# Patient Record
Sex: FEMALE | Race: WHITE | Employment: UNEMPLOYED | URBAN - METROPOLITAN AREA
[De-identification: names, ages, dates, MRNs, and addresses within clinical notes are randomized per-mention and may not be internally consistent; named-entity substitution may affect disease eponyms.]

---

## 2017-12-26 ENCOUNTER — LAB REQUISITION (OUTPATIENT)
Dept: LAB | Age: 57
End: 2017-12-26
Attending: INTERNAL MEDICINE

## 2017-12-26 DIAGNOSIS — L03.90 CELLULITIS: ICD-10-CM

## 2017-12-26 PROCEDURE — 85652 RBC SED RATE AUTOMATED: CPT | Performed by: INTERNAL MEDICINE

## 2017-12-26 PROCEDURE — 80048 BASIC METABOLIC PNL TOTAL CA: CPT | Performed by: INTERNAL MEDICINE

## 2017-12-26 PROCEDURE — 85025 COMPLETE CBC W/AUTO DIFF WBC: CPT | Performed by: INTERNAL MEDICINE

## 2018-01-02 ENCOUNTER — LAB REQUISITION (OUTPATIENT)
Dept: LAB | Age: 58
End: 2018-01-02
Attending: INTERNAL MEDICINE

## 2018-01-02 DIAGNOSIS — L03.90 CELLULITIS: ICD-10-CM

## 2018-01-02 LAB
BASOPHILS # BLD AUTO: 0.04 X10(3) UL (ref 0–0.1)
BASOPHILS NFR BLD AUTO: 0.5 %
BUN BLD-MCNC: 11 MG/DL (ref 8–20)
CALCIUM BLD-MCNC: 9.5 MG/DL (ref 8.3–10.3)
CHLORIDE: 107 MMOL/L (ref 101–111)
CO2: 26 MMOL/L (ref 22–32)
CREAT BLD-MCNC: 0.72 MG/DL (ref 0.55–1.02)
EOSINOPHIL # BLD AUTO: 0.09 X10(3) UL (ref 0–0.3)
EOSINOPHIL NFR BLD AUTO: 1.1 %
ERYTHROCYTE [DISTWIDTH] IN BLOOD BY AUTOMATED COUNT: 19.1 % (ref 11.5–16)
GLUCOSE BLD-MCNC: 87 MG/DL (ref 70–99)
HCT VFR BLD AUTO: 35 % (ref 34–50)
HGB BLD-MCNC: 10.4 G/DL (ref 12–16)
IMMATURE GRANULOCYTE COUNT: 0.01 X10(3) UL (ref 0–1)
IMMATURE GRANULOCYTE RATIO %: 0.1 %
LYMPHOCYTES # BLD AUTO: 2.11 X10(3) UL (ref 0.9–4)
LYMPHOCYTES NFR BLD AUTO: 26 %
MCH RBC QN AUTO: 24.3 PG (ref 27–33.2)
MCHC RBC AUTO-ENTMCNC: 29.7 G/DL (ref 31–37)
MCV RBC AUTO: 81.8 FL (ref 81–100)
MONOCYTES # BLD AUTO: 0.68 X10(3) UL (ref 0.1–0.6)
MONOCYTES NFR BLD AUTO: 8.4 %
NEUTROPHIL ABS PRELIM: 5.2 X10 (3) UL (ref 1.3–6.7)
NEUTROPHILS # BLD AUTO: 5.2 X10(3) UL (ref 1.3–6.7)
NEUTROPHILS NFR BLD AUTO: 63.9 %
PLATELET # BLD AUTO: 437 10(3)UL (ref 150–450)
POTASSIUM SERPL-SCNC: 3.7 MMOL/L (ref 3.6–5.1)
RBC # BLD AUTO: 4.28 X10(6)UL (ref 3.8–5.1)
RED CELL DISTRIBUTION WIDTH-SD: 56.2 FL (ref 35.1–46.3)
SED RATE-ML: 37 MM/HR (ref 0–25)
SODIUM SERPL-SCNC: 140 MMOL/L (ref 136–144)
WBC # BLD AUTO: 8.1 X10(3) UL (ref 4–13)

## 2018-01-02 PROCEDURE — 80048 BASIC METABOLIC PNL TOTAL CA: CPT | Performed by: INTERNAL MEDICINE

## 2018-01-02 PROCEDURE — 85652 RBC SED RATE AUTOMATED: CPT | Performed by: INTERNAL MEDICINE

## 2018-01-02 PROCEDURE — 85025 COMPLETE CBC W/AUTO DIFF WBC: CPT | Performed by: INTERNAL MEDICINE

## 2018-01-15 ENCOUNTER — LAB REQUISITION (OUTPATIENT)
Dept: LAB | Age: 58
End: 2018-01-15
Attending: INTERNAL MEDICINE

## 2018-01-15 DIAGNOSIS — L03.90 CELLULITIS: ICD-10-CM

## 2018-01-15 LAB
BASOPHILS # BLD AUTO: 0.04 X10(3) UL (ref 0–0.1)
BASOPHILS NFR BLD AUTO: 0.4 %
BUN BLD-MCNC: 9 MG/DL (ref 8–20)
C-REACTIVE PROTEIN: 9.7 MG/DL (ref ?–1)
CALCIUM BLD-MCNC: 9.3 MG/DL (ref 8.3–10.3)
CHLORIDE: 101 MMOL/L (ref 101–111)
CO2: 26 MMOL/L (ref 22–32)
CREAT BLD-MCNC: 0.73 MG/DL (ref 0.55–1.02)
EOSINOPHIL # BLD AUTO: 0.07 X10(3) UL (ref 0–0.3)
EOSINOPHIL NFR BLD AUTO: 0.7 %
ERYTHROCYTE [DISTWIDTH] IN BLOOD BY AUTOMATED COUNT: 19.2 % (ref 11.5–16)
GLUCOSE BLD-MCNC: 80 MG/DL (ref 70–99)
HCT VFR BLD AUTO: 35.1 % (ref 34–50)
HGB BLD-MCNC: 10.7 G/DL (ref 12–16)
IMMATURE GRANULOCYTE COUNT: 0.03 X10(3) UL (ref 0–1)
IMMATURE GRANULOCYTE RATIO %: 0.3 %
LYMPHOCYTES # BLD AUTO: 2.4 X10(3) UL (ref 0.9–4)
LYMPHOCYTES NFR BLD AUTO: 23.8 %
MCH RBC QN AUTO: 24.9 PG (ref 27–33.2)
MCHC RBC AUTO-ENTMCNC: 30.5 G/DL (ref 31–37)
MCV RBC AUTO: 81.6 FL (ref 81–100)
MONOCYTES # BLD AUTO: 1.04 X10(3) UL (ref 0.1–0.6)
MONOCYTES NFR BLD AUTO: 10.3 %
NEUTROPHIL ABS PRELIM: 6.52 X10 (3) UL (ref 1.3–6.7)
NEUTROPHILS # BLD AUTO: 6.52 X10(3) UL (ref 1.3–6.7)
NEUTROPHILS NFR BLD AUTO: 64.5 %
PLATELET # BLD AUTO: 425 10(3)UL (ref 150–450)
POTASSIUM SERPL-SCNC: 3.5 MMOL/L (ref 3.6–5.1)
RBC # BLD AUTO: 4.3 X10(6)UL (ref 3.8–5.1)
RED CELL DISTRIBUTION WIDTH-SD: 56.6 FL (ref 35.1–46.3)
SODIUM SERPL-SCNC: 136 MMOL/L (ref 136–144)
WBC # BLD AUTO: 10.1 X10(3) UL (ref 4–13)

## 2018-01-15 PROCEDURE — 86140 C-REACTIVE PROTEIN: CPT | Performed by: INTERNAL MEDICINE

## 2018-01-15 PROCEDURE — 80048 BASIC METABOLIC PNL TOTAL CA: CPT | Performed by: INTERNAL MEDICINE

## 2018-01-15 PROCEDURE — 85025 COMPLETE CBC W/AUTO DIFF WBC: CPT | Performed by: INTERNAL MEDICINE

## 2018-04-25 ENCOUNTER — LAB REQUISITION (OUTPATIENT)
Dept: LAB | Facility: HOSPITAL | Age: 58
End: 2018-04-25
Attending: INTERNAL MEDICINE

## 2018-04-25 DIAGNOSIS — R53.83 OTHER FATIGUE: ICD-10-CM

## 2018-04-25 PROCEDURE — 80053 COMPREHEN METABOLIC PANEL: CPT | Performed by: INTERNAL MEDICINE

## 2018-04-25 PROCEDURE — 85025 COMPLETE CBC W/AUTO DIFF WBC: CPT | Performed by: INTERNAL MEDICINE

## 2019-09-25 ENCOUNTER — HOSPITAL ENCOUNTER (EMERGENCY)
Facility: HOSPITAL | Age: 59
Discharge: NURSING, REHAB OR LONG-TERM CARE | End: 2019-09-26
Attending: EMERGENCY MEDICINE
Payer: COMMERCIAL

## 2019-09-25 DIAGNOSIS — F32.A DEPRESSION, UNSPECIFIED DEPRESSION TYPE: Primary | ICD-10-CM

## 2019-09-25 PROBLEM — D64.9 ANEMIA: Status: ACTIVE | Noted: 2019-09-25

## 2019-09-25 PROBLEM — R73.9 HYPERGLYCEMIA: Status: ACTIVE | Noted: 2019-09-25

## 2019-09-25 LAB
ALBUMIN SERPL-MCNC: 3.3 G/DL (ref 3.4–5)
ALBUMIN/GLOB SERPL: 0.8 {RATIO} (ref 1–2)
ALP LIVER SERPL-CCNC: 100 U/L (ref 46–118)
ALT SERPL-CCNC: 11 U/L (ref 13–56)
AMPHET UR QL SCN: NEGATIVE
ANION GAP SERPL CALC-SCNC: 2 MMOL/L (ref 0–18)
APAP SERPL-MCNC: <2 UG/ML (ref 10–30)
AST SERPL-CCNC: 9 U/L (ref 15–37)
BARBITURATES UR QL SCN: NEGATIVE
BASOPHILS # BLD AUTO: 0.04 X10(3) UL (ref 0–0.2)
BASOPHILS NFR BLD AUTO: 0.6 %
BENZODIAZ UR QL SCN: NEGATIVE
BILIRUB SERPL-MCNC: 0.3 MG/DL (ref 0.1–2)
BUN BLD-MCNC: 13 MG/DL (ref 7–18)
BUN/CREAT SERPL: 15.5 (ref 10–20)
CALCIUM BLD-MCNC: 9 MG/DL (ref 8.5–10.1)
CANNABINOIDS UR QL SCN: NEGATIVE
CHLORIDE SERPL-SCNC: 107 MMOL/L (ref 98–112)
CO2 SERPL-SCNC: 30 MMOL/L (ref 21–32)
COCAINE UR QL: NEGATIVE
CREAT BLD-MCNC: 0.84 MG/DL (ref 0.55–1.02)
CREAT UR-SCNC: 70.4 MG/DL
DEPRECATED RDW RBC AUTO: 51.8 FL (ref 35.1–46.3)
EOSINOPHIL # BLD AUTO: 0.13 X10(3) UL (ref 0–0.7)
EOSINOPHIL NFR BLD AUTO: 1.8 %
ERYTHROCYTE [DISTWIDTH] IN BLOOD BY AUTOMATED COUNT: 17.8 % (ref 11–15)
ETHANOL SERPL-MCNC: <3 MG/DL (ref ?–3)
ETHANOL UR-MCNC: NEGATIVE MG/DL
GLOBULIN PLAS-MCNC: 4.1 G/DL (ref 2.8–4.4)
GLUCOSE BLD-MCNC: 105 MG/DL (ref 70–99)
HCT VFR BLD AUTO: 33.5 % (ref 35–48)
HGB BLD-MCNC: 9.8 G/DL (ref 12–16)
IMM GRANULOCYTES # BLD AUTO: 0.03 X10(3) UL (ref 0–1)
IMM GRANULOCYTES NFR BLD: 0.4 %
LYMPHOCYTES # BLD AUTO: 1.72 X10(3) UL (ref 1–4)
LYMPHOCYTES NFR BLD AUTO: 24.3 %
M PROTEIN MFR SERPL ELPH: 7.4 G/DL (ref 6.4–8.2)
MCH RBC QN AUTO: 24.1 PG (ref 26–34)
MCHC RBC AUTO-ENTMCNC: 29.3 G/DL (ref 31–37)
MCV RBC AUTO: 82.3 FL (ref 80–100)
MONOCYTES # BLD AUTO: 0.52 X10(3) UL (ref 0.1–1)
MONOCYTES NFR BLD AUTO: 7.4 %
NEUTROPHILS # BLD AUTO: 4.63 X10 (3) UL (ref 1.5–7.7)
NEUTROPHILS # BLD AUTO: 4.63 X10(3) UL (ref 1.5–7.7)
NEUTROPHILS NFR BLD AUTO: 65.5 %
OSMOLALITY SERPL CALC.SUM OF ELEC: 288 MOSM/KG (ref 275–295)
PCP UR QL SCN: NEGATIVE
PLATELET # BLD AUTO: 341 10(3)UL (ref 150–450)
POTASSIUM SERPL-SCNC: 3.9 MMOL/L (ref 3.5–5.1)
RBC # BLD AUTO: 4.07 X10(6)UL (ref 3.8–5.3)
SALICYLATES SERPL-MCNC: <1.7 MG/DL (ref 2.8–20)
SODIUM SERPL-SCNC: 139 MMOL/L (ref 136–145)
WBC # BLD AUTO: 7.1 X10(3) UL (ref 4–11)

## 2019-09-25 RX ORDER — POTASSIUM CHLORIDE 750 MG/1
10 TABLET, FILM COATED, EXTENDED RELEASE ORAL DAILY
COMMUNITY

## 2019-09-25 RX ORDER — GABAPENTIN 400 MG/1
400 CAPSULE ORAL ONCE
Status: COMPLETED | OUTPATIENT
Start: 2019-09-25 | End: 2019-09-25

## 2019-09-25 RX ORDER — PANTOPRAZOLE SODIUM 40 MG/1
40 TABLET, DELAYED RELEASE ORAL
COMMUNITY

## 2019-09-25 RX ORDER — LEVOTHYROXINE SODIUM 88 UG/1
88 TABLET ORAL DAILY
COMMUNITY

## 2019-09-25 RX ORDER — DOCUSATE SODIUM 100 MG/1
100 CAPSULE, LIQUID FILLED ORAL DAILY
COMMUNITY

## 2019-09-25 RX ORDER — SERTRALINE HYDROCHLORIDE 25 MG/1
25 TABLET, FILM COATED ORAL DAILY
COMMUNITY

## 2019-09-25 RX ORDER — LOSARTAN POTASSIUM 50 MG/1
TABLET ORAL DAILY
COMMUNITY

## 2019-09-25 RX ORDER — GABAPENTIN 400 MG/1
400 CAPSULE ORAL 3 TIMES DAILY
COMMUNITY

## 2019-09-25 RX ORDER — SPIRONOLACTONE 25 MG/1
25 TABLET ORAL DAILY
COMMUNITY

## 2019-09-25 RX ORDER — BUPROPION HYDROCHLORIDE 75 MG/1
75 TABLET ORAL DAILY
COMMUNITY

## 2019-09-26 VITALS
RESPIRATION RATE: 18 BRPM | WEIGHT: 241 LBS | HEIGHT: 69 IN | OXYGEN SATURATION: 97 % | DIASTOLIC BLOOD PRESSURE: 60 MMHG | TEMPERATURE: 98 F | BODY MASS INDEX: 35.7 KG/M2 | HEART RATE: 66 BPM | SYSTOLIC BLOOD PRESSURE: 106 MMHG

## 2019-09-26 PROBLEM — F33.2 SEVERE EPISODE OF RECURRENT MAJOR DEPRESSIVE DISORDER, WITHOUT PSYCHOTIC FEATURES (HCC): Status: ACTIVE | Noted: 2019-09-26

## 2019-09-26 LAB
ATRIAL RATE: 63 BPM
P AXIS: 44 DEGREES
P-R INTERVAL: 176 MS
Q-T INTERVAL: 414 MS
QRS DURATION: 90 MS
QTC CALCULATION (BEZET): 423 MS
R AXIS: 3 DEGREES
T AXIS: 24 DEGREES
VENTRICULAR RATE: 63 BPM

## 2019-09-26 PROCEDURE — 90792 PSYCH DIAG EVAL W/MED SRVCS: CPT | Performed by: OTHER

## 2019-09-26 RX ORDER — POTASSIUM CHLORIDE 750 MG/1
10 TABLET, EXTENDED RELEASE ORAL DAILY
Status: DISCONTINUED | OUTPATIENT
Start: 2019-09-26 | End: 2019-09-26

## 2019-09-26 RX ORDER — LORAZEPAM 1 MG/1
1 TABLET ORAL ONCE
Status: COMPLETED | OUTPATIENT
Start: 2019-09-26 | End: 2019-09-26

## 2019-09-26 RX ORDER — BUPROPION HYDROCHLORIDE 75 MG/1
75 TABLET ORAL DAILY
Status: DISCONTINUED | OUTPATIENT
Start: 2019-09-26 | End: 2019-09-26

## 2019-09-26 RX ORDER — ACETAMINOPHEN 500 MG
1000 TABLET ORAL ONCE
Status: COMPLETED | OUTPATIENT
Start: 2019-09-26 | End: 2019-09-26

## 2019-09-26 RX ORDER — PANTOPRAZOLE SODIUM 40 MG/1
40 TABLET, DELAYED RELEASE ORAL
Status: DISCONTINUED | OUTPATIENT
Start: 2019-09-26 | End: 2019-09-26

## 2019-09-26 RX ORDER — SPIRONOLACTONE 25 MG/1
25 TABLET ORAL DAILY
Status: DISCONTINUED | OUTPATIENT
Start: 2019-09-26 | End: 2019-09-26

## 2019-09-26 RX ORDER — LOSARTAN POTASSIUM 50 MG/1
50 TABLET ORAL DAILY
Status: DISCONTINUED | OUTPATIENT
Start: 2019-09-26 | End: 2019-09-26

## 2019-09-26 RX ORDER — DOCUSATE SODIUM 100 MG/1
100 CAPSULE, LIQUID FILLED ORAL DAILY
Status: DISCONTINUED | OUTPATIENT
Start: 2019-09-26 | End: 2019-09-26

## 2019-09-26 RX ORDER — SERTRALINE HYDROCHLORIDE 25 MG/1
25 TABLET, FILM COATED ORAL DAILY
Status: DISCONTINUED | OUTPATIENT
Start: 2019-09-26 | End: 2019-09-26

## 2019-09-26 RX ORDER — GABAPENTIN 400 MG/1
400 CAPSULE ORAL 3 TIMES DAILY
Status: DISCONTINUED | OUTPATIENT
Start: 2019-09-26 | End: 2019-09-26

## 2019-09-26 RX ORDER — LEVOTHYROXINE SODIUM 88 UG/1
88 TABLET ORAL
Status: DISCONTINUED | OUTPATIENT
Start: 2019-09-26 | End: 2019-09-26

## 2019-09-26 NOTE — ED PROVIDER NOTES
No events during my shift. Awaiting placement by [facility].   Patient signed out to oncoming emergency physician

## 2019-09-26 NOTE — ED NOTES
Unable to reach intake at [facility] for an update. [address] reported [facility] unit currently closed. Packet faxed to [employer].
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
## 2019-09-26 NOTE — ED INITIAL ASSESSMENT (HPI)
Pt presents to ed via ems from [facility] with suicidal ideation. Petition from [facility] on chart. Pt is a&ox4, moves all extremities, resps easy.

## 2019-09-26 NOTE — BH LEVEL OF CARE ASSESSMENT
Level of Care Assessment Note     General Questions  Why are you here?: \"I don't want to live. I don't know why. It all seems to start with these falls that I started taking and hitting my head. \"  Three falls in the past year and other falls before that. for about a week.    Family's Biggest Areas of Concern: suicide risk and depression.      Referral Source  Referral Source: Friend/Relative  Referral Source Info: home health worker     Suicide Risk  Source of information for CSSR: Patient  In what setting Experience of Loss or Near Loss by Suicide: Denies     Danger to Others/Property  Have you harmed someone or had thoughts about wanting someone harmed or killed in the past 30 days?: No  Have you harmed someone or had thoughts about wanting someone harmed lbs in the past month)  Unplanned Weight Gain: No  History of Eating Disorder: No  Active Eating Disorder: No     SCOFF Questionnaire  Do you make yourself Sick because you feel uncomfortably full?: No  Do you worry that you have lost Control over how much Independent  Toileting  Patient Incontinence: None  Special Considerations  Patient has pressures sores, surgical wounds, bandages/dressings?: Yes (comment)(long elastic circulation band wrapped around right leg from foot to knee)  Patient uses any kind of hospital)  Job Issues: (n/a)  Concerns/Conflicts with Social Relationships: Yes  Describe Concerns/Conflicts with Social Relationships[de-identified] \"I have no social life\"  Decreased Functional Ability: (denies change in fx)  Do you have any prior/current legal con Ordinary  Level of Consciousness: Alert  Level of Consciousness: Alert  Behavior  Exhibited behavior: Appropriate to situation     Assessment Summary  Assessment Summary: Monika Stroud is a 59- woman (in the process of divorce) with multiple medical issues Precautions: Fall(pt has pain pump in back; healing skin graft on foot but positive for MRSA; one leg wrapped in elastic bandage from foot to knee; uses wheelchair; history of falls; history of multiple back surgeries; appears to cognitively intact and ind

## 2019-09-26 NOTE — CONSULTS
[facility]  Report of Consultation    Vanessa Asters Timothy Patient Status:  Emergency    1960 MRN [id number]   [address] Attending Jose A Mejia MD   Hosp Day # 0 PCP Nathan Veras V     Date of Admission: thinking about suicide. Denies having any specific plans. Does not want to die but does not want to live like this either. Took pills in July in an overdose attempt.   States that she was hospitalized at [facility] after that and still regrets her decision to surgeries; gall bladder surgery     Family History:  No family history on file. Social History:   Lives in [place] alone. In the process of divorce.   NO family in the area; no children; pushed friends away; sister is in [place]  Not working; on [address] hallucinations  Consciousness/Orientation: Alert and oriented x 4 and Oriented person, place, time, situation  Concentration:   fair and as assessed by  ability to concentrate on our conversation  Memory:  intact immediate, recent, remote and as evidenced therapeutic doses or switching meds from Wellbutrin and Zoloft to just Cymbalta. Thank you for allowing me to participate in the care of your patient.     Deric Wade  9/26/2019  11:16 AM

## 2019-09-26 NOTE — ED NOTES
Packet faxed to [facility] and [facility] for review for psychiatric admission.

## 2019-09-26 NOTE — ED NOTES
PTS BELONGINGS PUT IN C0 HALLWAY LOCKER AND PTS WHEELCHAIR IN PTS ROOM. PT IS W/C BOUND CANNOT GET UP WITHOUT PHYSICAL HELP INTO WHEELCHAIR.  APPROVED BY SECURITY AND CHARGE RN NOTIFIED

## 2019-09-30 LAB
OPIATES, UR, 6-ACETYLMORPHINE: <10 NG/ML
OPIATES, URINE, CODEINE: <20 NG/ML
OPIATES, URINE, HYDROCODONE: <20 NG/ML
OPIATES, URINE, HYDROMORPHONE: 106 NG/ML
OPIATES, URINE, MORPHINE: <20 NG/ML
OPIATES, URINE, NORHYDROCODONE: <20 NG/ML
OPIATES, URINE, NOROXYCODONE: 37 NG/ML
OPIATES, URINE, NOROXYMORPHONE: <20 NG/ML
OPIATES, URINE, OXYCODONE: <20 NG/ML
OPIATES, URINE, OXYMORPHONE: <20 NG/ML